# Patient Record
Sex: FEMALE | Race: WHITE | NOT HISPANIC OR LATINO | Employment: FULL TIME | URBAN - NONMETROPOLITAN AREA
[De-identification: names, ages, dates, MRNs, and addresses within clinical notes are randomized per-mention and may not be internally consistent; named-entity substitution may affect disease eponyms.]

---

## 2017-08-22 ENCOUNTER — HOSPITAL ENCOUNTER (EMERGENCY)
Facility: HOSPITAL | Age: 22
Discharge: HOME | End: 2017-08-22
Attending: EMERGENCY MEDICINE | Admitting: EMERGENCY MEDICINE

## 2017-08-22 ENCOUNTER — APPOINTMENT (OUTPATIENT)
Dept: GENERAL RADIOLOGY | Facility: HOSPITAL | Age: 22
End: 2017-08-22

## 2017-08-22 VITALS
SYSTOLIC BLOOD PRESSURE: 141 MMHG | TEMPERATURE: 98 F | HEIGHT: 63 IN | WEIGHT: 279 LBS | BODY MASS INDEX: 49.43 KG/M2 | DIASTOLIC BLOOD PRESSURE: 95 MMHG | OXYGEN SATURATION: 100 % | HEART RATE: 86 BPM | RESPIRATION RATE: 20 BRPM

## 2017-08-22 DIAGNOSIS — R07.9 CHEST PAIN, UNSPECIFIED TYPE: Primary | ICD-10-CM

## 2017-08-22 DIAGNOSIS — F41.8 ANXIETY ABOUT HEALTH: ICD-10-CM

## 2017-08-22 LAB
ALBUMIN SERPL-MCNC: 4.4 G/DL (ref 3.5–5)
ALBUMIN/GLOB SERPL: 1.4 G/DL (ref 1.5–2.5)
ALP SERPL-CCNC: 102 U/L (ref 35–104)
ALT SERPL W P-5'-P-CCNC: 22 U/L (ref 10–36)
ANION GAP SERPL CALCULATED.3IONS-SCNC: 6.7 MMOL/L (ref 3.6–11.2)
AST SERPL-CCNC: 21 U/L (ref 10–30)
B-HCG UR QL: NEGATIVE
BASOPHILS # BLD AUTO: 0.06 10*3/MM3 (ref 0–0.3)
BASOPHILS NFR BLD AUTO: 0.5 % (ref 0–2)
BILIRUB SERPL-MCNC: 0.4 MG/DL (ref 0.2–1.8)
BILIRUB UR QL STRIP: NEGATIVE
BUN BLD-MCNC: 11 MG/DL (ref 7–21)
BUN/CREAT SERPL: 13.1 (ref 7–25)
CALCIUM SPEC-SCNC: 10.1 MG/DL (ref 7.7–10)
CHLORIDE SERPL-SCNC: 109 MMOL/L (ref 99–112)
CK MB SERPL-CCNC: 0.25 NG/ML (ref 0–5)
CK MB SERPL-RTO: 0.3 % (ref 0–3)
CK SERPL-CCNC: 99 U/L (ref 24–173)
CLARITY UR: ABNORMAL
CO2 SERPL-SCNC: 25.3 MMOL/L (ref 24.3–31.9)
COLOR UR: YELLOW
CREAT BLD-MCNC: 0.84 MG/DL (ref 0.43–1.29)
DEPRECATED RDW RBC AUTO: 42.1 FL (ref 37–54)
EOSINOPHIL # BLD AUTO: 0.11 10*3/MM3 (ref 0–0.7)
EOSINOPHIL NFR BLD AUTO: 0.9 % (ref 0–5)
ERYTHROCYTE [DISTWIDTH] IN BLOOD BY AUTOMATED COUNT: 13.8 % (ref 11.5–14.5)
GFR SERPL CREATININE-BSD FRML MDRD: 83 ML/MIN/1.73
GLOBULIN UR ELPH-MCNC: 3.2 GM/DL
GLUCOSE BLD-MCNC: 95 MG/DL (ref 70–110)
GLUCOSE UR STRIP-MCNC: NEGATIVE MG/DL
HCT VFR BLD AUTO: 40.7 % (ref 37–47)
HGB BLD-MCNC: 13.4 G/DL (ref 12–16)
HGB UR QL STRIP.AUTO: NEGATIVE
IMM GRANULOCYTES # BLD: 0.04 10*3/MM3 (ref 0–0.03)
IMM GRANULOCYTES NFR BLD: 0.3 % (ref 0–0.5)
KETONES UR QL STRIP: NEGATIVE
LEUKOCYTE ESTERASE UR QL STRIP.AUTO: NEGATIVE
LIPASE SERPL-CCNC: 26 U/L (ref 13–60)
LYMPHOCYTES # BLD AUTO: 3.24 10*3/MM3 (ref 1–3)
LYMPHOCYTES NFR BLD AUTO: 25.2 % (ref 21–51)
MCH RBC QN AUTO: 28 PG (ref 27–33)
MCHC RBC AUTO-ENTMCNC: 32.9 G/DL (ref 33–37)
MCV RBC AUTO: 85 FL (ref 80–94)
MONOCYTES # BLD AUTO: 1.04 10*3/MM3 (ref 0.1–0.9)
MONOCYTES NFR BLD AUTO: 8.1 % (ref 0–10)
NEUTROPHILS # BLD AUTO: 8.37 10*3/MM3 (ref 1.4–6.5)
NEUTROPHILS NFR BLD AUTO: 65 % (ref 30–70)
NITRITE UR QL STRIP: NEGATIVE
OSMOLALITY SERPL CALC.SUM OF ELEC: 280.5 MOSM/KG (ref 273–305)
PH UR STRIP.AUTO: 7 [PH] (ref 5–8)
PLATELET # BLD AUTO: 377 10*3/MM3 (ref 130–400)
PMV BLD AUTO: 10.4 FL (ref 6–10)
POTASSIUM BLD-SCNC: 3.7 MMOL/L (ref 3.5–5.3)
PROT SERPL-MCNC: 7.6 G/DL (ref 6–8)
PROT UR QL STRIP: NEGATIVE
RBC # BLD AUTO: 4.79 10*6/MM3 (ref 4.2–5.4)
SODIUM BLD-SCNC: 141 MMOL/L (ref 135–153)
SP GR UR STRIP: 1.02 (ref 1–1.03)
TROPONIN I SERPL-MCNC: <0.006 NG/ML
UROBILINOGEN UR QL STRIP: ABNORMAL
WBC NRBC COR # BLD: 12.86 10*3/MM3 (ref 4.5–12.5)

## 2017-08-22 PROCEDURE — 71020 XR CHEST 2 VW: CPT | Performed by: RADIOLOGY

## 2017-08-22 PROCEDURE — 81025 URINE PREGNANCY TEST: CPT | Performed by: EMERGENCY MEDICINE

## 2017-08-22 PROCEDURE — 83690 ASSAY OF LIPASE: CPT | Performed by: EMERGENCY MEDICINE

## 2017-08-22 PROCEDURE — 93005 ELECTROCARDIOGRAM TRACING: CPT | Performed by: FAMILY MEDICINE

## 2017-08-22 PROCEDURE — 85025 COMPLETE CBC W/AUTO DIFF WBC: CPT | Performed by: EMERGENCY MEDICINE

## 2017-08-22 PROCEDURE — 99284 EMERGENCY DEPT VISIT MOD MDM: CPT

## 2017-08-22 PROCEDURE — 71020 HC CHEST PA AND LATERAL: CPT

## 2017-08-22 PROCEDURE — 93010 ELECTROCARDIOGRAM REPORT: CPT | Performed by: INTERNAL MEDICINE

## 2017-08-22 PROCEDURE — 82553 CREATINE MB FRACTION: CPT | Performed by: EMERGENCY MEDICINE

## 2017-08-22 PROCEDURE — 82550 ASSAY OF CK (CPK): CPT | Performed by: EMERGENCY MEDICINE

## 2017-08-22 PROCEDURE — 81003 URINALYSIS AUTO W/O SCOPE: CPT | Performed by: EMERGENCY MEDICINE

## 2017-08-22 PROCEDURE — 84484 ASSAY OF TROPONIN QUANT: CPT | Performed by: EMERGENCY MEDICINE

## 2017-08-22 PROCEDURE — 80053 COMPREHEN METABOLIC PANEL: CPT | Performed by: EMERGENCY MEDICINE

## 2017-08-22 RX ORDER — NORETHINDRONE ACETATE AND ETHINYL ESTRADIOL 1; 5 MG/1; UG/1
TABLET ORAL DAILY
COMMUNITY

## 2017-08-22 NOTE — ED NOTES
Patient sitting in lobby using cell phone, no acute distress noted.      Marbella Rodríguez RN  08/22/17 9460

## 2017-08-23 NOTE — ED NOTES
Pt anxious response to being stuck by ER tech. Pt given detailed explanations about the process and the reasons for lab collection. Pt calmed after instructed to breathe in through nose and out of mouth. Pt tolerated well. Mother at bedside.      Sonia Barnard RN  08/22/17 0062

## 2017-08-23 NOTE — ED NOTES
Upon discharge Pt sitting up in bed crying worried that she will not be able to sleep at night due to her anxiety. Pt was offered to discuss the matters with Dr. Kwok and refused. Pt was educated about effective coping mechanisms and ways of distraction. Pt states that she has a psychiatrist and will follow up with them and her PCP to discuss ways to manage anxiety. Pt states that she does not want medicine to help with anxiety. Mother at bedside and educated along with patient.      Sonia Barnard RN  08/22/17 6204

## 2017-08-23 NOTE — ED PROVIDER NOTES
"Subjective   HPI Comments: Pt comes in today with C/O \"I think I had a severe panic attack on Sunday\".  Pt describes episode of being very anxious over \"sinus issues\".  Pt got SOA, Weak and dizzy then almost passed out.  Pt has felt like her left chest was tight.  Today she saw her ENT for her sinus issues.  Prescribed flonase, prednisone and zyrtec.  She has only started the flonase.  While at ENT pt was told her episode on Sunday may have been because of her heart.  This made her have increased anxiety and her chest tightened up and her left arm started tingling.    Patient is a 22 y.o. female presenting with chest pain.   History provided by:  Patient and parent  Chest Pain   Pain location:  L chest  Pain quality: tightness    Pain radiates to:  L arm  Duration:  2 days  Progression:  Waxing and waning  Context: stress    Context: not breathing, not drug use, not eating, not intercourse, not lifting, not movement, not raising an arm, not at rest and not trauma    Relieved by:  Nothing  Worsened by:  Nothing  Ineffective treatments:  None tried  Associated symptoms: anxiety, dizziness, near-syncope, numbness, shortness of breath and weakness    Associated symptoms: no abdominal pain, no AICD problem, no altered mental status, no anorexia, no back pain, no claudication, no cough, no diaphoresis, no dysphagia, no fatigue, no fever, no headache, no heartburn, no lower extremity edema, no nausea, no orthopnea, no palpitations, no PND, no syncope and no vomiting    Risk factors: birth control and obesity    Risk factors: no aortic disease, no coronary artery disease, no diabetes mellitus, no Blanche-Danlos syndrome, no high cholesterol, no hypertension, no immobilization, not male, no Marfan's syndrome, not pregnant, no prior DVT/PE, no smoking and no surgery        Review of Systems   Constitutional: Negative for diaphoresis, fatigue and fever.   HENT: Positive for congestion and postnasal drip. Negative for nosebleeds " and trouble swallowing.    Eyes: Negative.    Respiratory: Positive for chest tightness and shortness of breath. Negative for cough.    Cardiovascular: Positive for chest pain and near-syncope. Negative for palpitations, orthopnea, claudication, syncope and PND.   Gastrointestinal: Negative.  Negative for abdominal pain, anorexia, heartburn, nausea and vomiting.   Endocrine: Negative.    Genitourinary: Negative.    Musculoskeletal: Negative.  Negative for back pain.   Skin: Negative.    Allergic/Immunologic: Negative.    Neurological: Positive for dizziness, weakness and numbness. Negative for headaches.   Psychiatric/Behavioral: The patient is nervous/anxious.    All other systems reviewed and are negative.      Past Medical History:   Diagnosis Date   • Anxiety        No Known Allergies    History reviewed. No pertinent surgical history.    History reviewed. No pertinent family history.    Social History     Social History   • Marital status: Single     Spouse name: N/A   • Number of children: N/A   • Years of education: N/A     Social History Main Topics   • Smoking status: Never Smoker   • Smokeless tobacco: None   • Alcohol use None   • Drug use: None   • Sexual activity: Not Asked     Other Topics Concern   • None     Social History Narrative   • None           Objective   Physical Exam   Constitutional: She is oriented to person, place, and time. She appears well-developed and well-nourished. No distress.   Resting comfortably in stretcher.  Reclined, smiling, texting   HENT:   Head: Normocephalic and atraumatic.   Nose: Nose normal.   Moist mucous membranes   Eyes: Conjunctivae are normal. Pupils are equal, round, and reactive to light. Right eye exhibits no discharge. Left eye exhibits no discharge. No scleral icterus.   Neck: Normal range of motion. Neck supple. No tracheal deviation present.   Cardiovascular: Normal rate, regular rhythm, normal heart sounds and intact distal pulses.  Exam reveals no  gallop and no friction rub.    No murmur heard.  Pulmonary/Chest: Effort normal and breath sounds normal. No stridor. No respiratory distress. She has no wheezes. She has no rales.   Abdominal: Soft. Bowel sounds are normal. She exhibits no distension. There is no tenderness. There is no guarding.   Musculoskeletal: Normal range of motion. She exhibits no deformity.   Neurological: She is alert and oriented to person, place, and time. No cranial nerve deficit. She exhibits normal muscle tone. Coordination normal.   Skin: Skin is warm and dry. No pallor.   Psychiatric:   anxious   Nursing note and vitals reviewed.      Procedures         ED Course  ED Course   Value Comment By Time   ECG 12 Lead 12-lead EKG performed at 1845 hrs.  Normal sinus rhythm.  Normal ECG.  Ventricular rate 98.  MI interval 130.  QRS duration 70.  QTc 338.  QTc 431.  No significant ectopy or sustained dysrhythmia.  No pathologic blocks.  No acute ischemic ST segment deviation. Tj Abdi MD 08/22 2134    PA and lateral chest x-rayMy readNo apparent acute infiltrate or injury Tj Abdi MD 08/22 2135    Patient hemodynamically stable and neurologically intact while in the emergency department.  Patient had a panic attack related to blood draw.  She is now calm down  No significantly abnormal findings. Tj Abdi MD 08/22 2145            HEART Score  History: Slightly suspicious (+0)  ECG: Normal (+0)  Age: Less than 45 (+0)  Risk Factors: 1 - 2 risk factors (+1)  Troponin: Normal limit or lower (+0)  Total: 1       XR Chest 2 View    (Results Pending)     Labs Reviewed   COMPREHENSIVE METABOLIC PANEL - Abnormal; Notable for the following:        Result Value    Calcium 10.1 (*)     A/G Ratio 1.4 (*)     All other components within normal limits   URINALYSIS W/ CULTURE IF INDICATED - Abnormal; Notable for the following:     Appearance, UA Turbid (*)     All other components within normal limits    Narrative:      Urine microscopic not indicated.   CBC WITH AUTO DIFFERENTIAL - Abnormal; Notable for the following:     WBC 12.86 (*)     MCHC 32.9 (*)     MPV 10.4 (*)     Neutrophils, Absolute 8.37 (*)     Lymphocytes, Absolute 3.24 (*)     Monocytes, Absolute 1.04 (*)     Immature Grans, Absolute 0.04 (*)     All other components within normal limits   LIPASE - Normal   PREGNANCY, URINE - Normal    Narrative:     Diluted specimens may cause false negative results.   CK - Normal   CK MB - Normal   TROPONIN (IN-HOUSE) - Normal    Narrative:     Ultra Troponin I Reference Range:         <=0.039 ng/mL: Negative    0.04-0.779 ng/mL: Indeterminate Range. Suspicious of MI.  Clinical correlation required.       >=0.78  ng/mL: Consistent with myocardial injury.  Clinical correlation required.   OSMOLALITY, CALCULATED - Normal   CKMB INDEX CALCULATION - Normal   CBC AND DIFFERENTIAL    Narrative:     The following orders were created for panel order CBC & Differential.  Procedure                               Abnormality         Status                     ---------                               -----------         ------                     CBC Auto Differential[[id number]]        Abnormal            Final result                 Please view results for these tests on the individual orders.        Medication List      CONTINUE taking these medications          norethindrone-ethinyl estradiol 1-5 MG-MCG tablet   Commonly known as:  FEMHRT 1/5             MDM  Number of Diagnoses or Management Options  Anxiety about health: established and worsening  Chest pain, unspecified type: new and requires workup     Amount and/or Complexity of Data Reviewed  Clinical lab tests: ordered and reviewed  Tests in the radiology section of CPT®: ordered and reviewed  Independent visualization of images, tracings, or specimens: yes    Risk of Complications, Morbidity, and/or Mortality  Presenting problems: high  Diagnostic procedures: moderate  Management  options: moderate    Patient Progress  Patient progress: stable      Final diagnoses:   Chest pain, unspecified type   Anxiety about health            Tj Abdi MD  08/22/17 3014

## 2023-08-14 ENCOUNTER — TRANSCRIBE ORDERS (OUTPATIENT)
Dept: ADMINISTRATIVE | Facility: HOSPITAL | Age: 28
End: 2023-08-14
Payer: MEDICAID

## 2023-08-14 DIAGNOSIS — M79.662 PAIN AND SWELLING OF LEFT LOWER LEG: ICD-10-CM

## 2023-08-14 DIAGNOSIS — M79.89 PAIN AND SWELLING OF LEFT LOWER LEG: ICD-10-CM

## 2023-09-19 ENCOUNTER — TRANSCRIBE ORDERS (OUTPATIENT)
Dept: OCCUPATIONAL THERAPY | Facility: HOSPITAL | Age: 28
End: 2023-09-19
Payer: MEDICAID

## 2023-09-19 DIAGNOSIS — I89.0 LYMPHEDEMA: Primary | ICD-10-CM

## 2023-09-22 ENCOUNTER — HOSPITAL ENCOUNTER (OUTPATIENT)
Dept: RESPIRATORY THERAPY | Facility: HOSPITAL | Age: 28
Discharge: HOME | End: 2023-09-22
Payer: MEDICAID

## 2023-09-22 ENCOUNTER — TRANSCRIBE ORDERS (OUTPATIENT)
Dept: ADMINISTRATIVE | Facility: HOSPITAL | Age: 28
End: 2023-09-22
Payer: MEDICAID

## 2023-09-22 ENCOUNTER — LAB (OUTPATIENT)
Dept: LAB | Facility: HOSPITAL | Age: 28
End: 2023-09-22
Payer: MEDICAID

## 2023-09-22 DIAGNOSIS — R00.1 BRADYCARDIA: ICD-10-CM

## 2023-09-22 DIAGNOSIS — R00.1 BRADYCARDIA: Primary | ICD-10-CM

## 2023-09-22 PROCEDURE — 80053 COMPREHEN METABOLIC PANEL: CPT

## 2023-09-22 PROCEDURE — 93246 EXT ECG>7D<15D RECORDING: CPT

## 2023-09-22 PROCEDURE — 83036 HEMOGLOBIN GLYCOSYLATED A1C: CPT

## 2023-09-22 PROCEDURE — 36415 COLL VENOUS BLD VENIPUNCTURE: CPT

## 2023-09-22 PROCEDURE — 84443 ASSAY THYROID STIM HORMONE: CPT

## 2023-09-23 ENCOUNTER — APPOINTMENT (OUTPATIENT)
Dept: GENERAL RADIOLOGY | Facility: HOSPITAL | Age: 28
End: 2023-09-23
Payer: MEDICAID

## 2023-09-23 ENCOUNTER — HOSPITAL ENCOUNTER (EMERGENCY)
Facility: HOSPITAL | Age: 28
Discharge: HOME | End: 2023-09-23
Attending: STUDENT IN AN ORGANIZED HEALTH CARE EDUCATION/TRAINING PROGRAM
Payer: MEDICAID

## 2023-09-23 VITALS
OXYGEN SATURATION: 98 % | TEMPERATURE: 98.5 F | DIASTOLIC BLOOD PRESSURE: 88 MMHG | BODY MASS INDEX: 46.07 KG/M2 | WEIGHT: 260 LBS | SYSTOLIC BLOOD PRESSURE: 125 MMHG | HEIGHT: 63 IN | HEART RATE: 59 BPM | RESPIRATION RATE: 18 BRPM

## 2023-09-23 DIAGNOSIS — R00.1 BRADYCARDIA: Primary | ICD-10-CM

## 2023-09-23 LAB
ALBUMIN SERPL-MCNC: 3.7 G/DL (ref 3.5–5.2)
ALBUMIN SERPL-MCNC: 3.9 G/DL (ref 3.5–5.2)
ALBUMIN/GLOB SERPL: 1.3 G/DL
ALBUMIN/GLOB SERPL: 1.5 G/DL
ALP SERPL-CCNC: 87 U/L (ref 39–117)
ALP SERPL-CCNC: 95 U/L (ref 39–117)
ALT SERPL W P-5'-P-CCNC: 18 U/L (ref 1–33)
ALT SERPL W P-5'-P-CCNC: 22 U/L (ref 1–33)
ANION GAP SERPL CALCULATED.3IONS-SCNC: 10.9 MMOL/L (ref 5–15)
ANION GAP SERPL CALCULATED.3IONS-SCNC: 11.7 MMOL/L (ref 5–15)
APTT PPP: 33.9 SECONDS (ref 26.5–34.5)
AST SERPL-CCNC: 18 U/L (ref 1–32)
AST SERPL-CCNC: 21 U/L (ref 1–32)
B-HCG UR QL: NEGATIVE
BACTERIA UR QL AUTO: ABNORMAL /HPF
BASOPHILS # BLD AUTO: 0.06 10*3/MM3 (ref 0–0.2)
BASOPHILS NFR BLD AUTO: 0.8 % (ref 0–1.5)
BILIRUB SERPL-MCNC: 0.7 MG/DL (ref 0–1.2)
BILIRUB SERPL-MCNC: 0.7 MG/DL (ref 0–1.2)
BILIRUB UR QL STRIP: NEGATIVE
BUN SERPL-MCNC: 8 MG/DL (ref 6–20)
BUN SERPL-MCNC: 8 MG/DL (ref 6–20)
BUN/CREAT SERPL: 10.1 (ref 7–25)
BUN/CREAT SERPL: 12.7 (ref 7–25)
CALCIUM SPEC-SCNC: 9.3 MG/DL (ref 8.6–10.5)
CALCIUM SPEC-SCNC: 9.4 MG/DL (ref 8.6–10.5)
CHLORIDE SERPL-SCNC: 103 MMOL/L (ref 98–107)
CHLORIDE SERPL-SCNC: 106 MMOL/L (ref 98–107)
CLARITY UR: ABNORMAL
CO2 SERPL-SCNC: 22.3 MMOL/L (ref 22–29)
CO2 SERPL-SCNC: 24.1 MMOL/L (ref 22–29)
COLOR UR: ABNORMAL
CREAT SERPL-MCNC: 0.63 MG/DL (ref 0.57–1)
CREAT SERPL-MCNC: 0.79 MG/DL (ref 0.57–1)
DEPRECATED RDW RBC AUTO: 46.8 FL (ref 37–54)
EGFRCR SERPLBLD CKD-EPI 2021: 103.3 ML/MIN/1.73
EGFRCR SERPLBLD CKD-EPI 2021: 122.6 ML/MIN/1.73
EOSINOPHIL # BLD AUTO: 0.11 10*3/MM3 (ref 0–0.4)
EOSINOPHIL NFR BLD AUTO: 1.4 % (ref 0.3–6.2)
ERYTHROCYTE [DISTWIDTH] IN BLOOD BY AUTOMATED COUNT: 14.4 % (ref 12.3–15.4)
GEN 5 2HR TROPONIN T REFLEX: <6 NG/L
GLOBULIN UR ELPH-MCNC: 2.6 GM/DL
GLOBULIN UR ELPH-MCNC: 2.9 GM/DL
GLUCOSE SERPL-MCNC: 88 MG/DL (ref 65–99)
GLUCOSE SERPL-MCNC: 91 MG/DL (ref 65–99)
GLUCOSE UR STRIP-MCNC: NEGATIVE MG/DL
HBA1C MFR BLD: 4.8 % (ref 4.8–5.6)
HCT VFR BLD AUTO: 40.5 % (ref 34–46.6)
HGB BLD-MCNC: 13.1 G/DL (ref 12–15.9)
HGB UR QL STRIP.AUTO: ABNORMAL
HYALINE CASTS UR QL AUTO: ABNORMAL /LPF
IMM GRANULOCYTES # BLD AUTO: 0.02 10*3/MM3 (ref 0–0.05)
IMM GRANULOCYTES NFR BLD AUTO: 0.3 % (ref 0–0.5)
INR PPP: 1.04 (ref 0.9–1.1)
KETONES UR QL STRIP: ABNORMAL
LEUKOCYTE ESTERASE UR QL STRIP.AUTO: ABNORMAL
LYMPHOCYTES # BLD AUTO: 1.69 10*3/MM3 (ref 0.7–3.1)
LYMPHOCYTES NFR BLD AUTO: 21.2 % (ref 19.6–45.3)
MCH RBC QN AUTO: 29.2 PG (ref 26.6–33)
MCHC RBC AUTO-ENTMCNC: 32.3 G/DL (ref 31.5–35.7)
MCV RBC AUTO: 90.2 FL (ref 79–97)
MONOCYTES # BLD AUTO: 0.67 10*3/MM3 (ref 0.1–0.9)
MONOCYTES NFR BLD AUTO: 8.4 % (ref 5–12)
NEUTROPHILS NFR BLD AUTO: 5.42 10*3/MM3 (ref 1.7–7)
NEUTROPHILS NFR BLD AUTO: 67.9 % (ref 42.7–76)
NITRITE UR QL STRIP: NEGATIVE
NRBC BLD AUTO-RTO: 0 /100 WBC (ref 0–0.2)
PH UR STRIP.AUTO: 6.5 [PH] (ref 5–8)
PLATELET # BLD AUTO: 257 10*3/MM3 (ref 140–450)
PMV BLD AUTO: 11.4 FL (ref 6–12)
POTASSIUM SERPL-SCNC: 3.7 MMOL/L (ref 3.5–5.2)
POTASSIUM SERPL-SCNC: 4.1 MMOL/L (ref 3.5–5.2)
PROT SERPL-MCNC: 6.5 G/DL (ref 6–8.5)
PROT SERPL-MCNC: 6.6 G/DL (ref 6–8.5)
PROT UR QL STRIP: ABNORMAL
PROTHROMBIN TIME: 14.1 SECONDS (ref 12.1–14.7)
RBC # BLD AUTO: 4.49 10*6/MM3 (ref 3.77–5.28)
RBC # UR STRIP: ABNORMAL /HPF
REF LAB TEST METHOD: ABNORMAL
SODIUM SERPL-SCNC: 137 MMOL/L (ref 136–145)
SODIUM SERPL-SCNC: 141 MMOL/L (ref 136–145)
SP GR UR STRIP: 1.03 (ref 1–1.03)
SQUAMOUS #/AREA URNS HPF: ABNORMAL /HPF
TROPONIN T DELTA: NORMAL
TROPONIN T SERPL HS-MCNC: <6 NG/L
TSH SERPL DL<=0.05 MIU/L-ACNC: 2.75 UIU/ML (ref 0.27–4.2)
TSH SERPL DL<=0.05 MIU/L-ACNC: 2.79 UIU/ML (ref 0.27–4.2)
UROBILINOGEN UR QL STRIP: ABNORMAL
WBC # UR STRIP: ABNORMAL /HPF
WBC NRBC COR # BLD: 7.97 10*3/MM3 (ref 3.4–10.8)

## 2023-09-23 PROCEDURE — 84443 ASSAY THYROID STIM HORMONE: CPT | Performed by: NURSE PRACTITIONER

## 2023-09-23 PROCEDURE — 93005 ELECTROCARDIOGRAM TRACING: CPT | Performed by: EMERGENCY MEDICINE

## 2023-09-23 PROCEDURE — 81025 URINE PREGNANCY TEST: CPT | Performed by: NURSE PRACTITIONER

## 2023-09-23 PROCEDURE — 80053 COMPREHEN METABOLIC PANEL: CPT | Performed by: NURSE PRACTITIONER

## 2023-09-23 PROCEDURE — 93010 ELECTROCARDIOGRAM REPORT: CPT | Performed by: INTERNAL MEDICINE

## 2023-09-23 PROCEDURE — 85025 COMPLETE CBC W/AUTO DIFF WBC: CPT | Performed by: NURSE PRACTITIONER

## 2023-09-23 PROCEDURE — 85610 PROTHROMBIN TIME: CPT | Performed by: NURSE PRACTITIONER

## 2023-09-23 PROCEDURE — 99284 EMERGENCY DEPT VISIT MOD MDM: CPT

## 2023-09-23 PROCEDURE — 84484 ASSAY OF TROPONIN QUANT: CPT | Performed by: NURSE PRACTITIONER

## 2023-09-23 PROCEDURE — 71045 X-RAY EXAM CHEST 1 VIEW: CPT

## 2023-09-23 PROCEDURE — 36415 COLL VENOUS BLD VENIPUNCTURE: CPT

## 2023-09-23 PROCEDURE — 71045 X-RAY EXAM CHEST 1 VIEW: CPT | Performed by: RADIOLOGY

## 2023-09-23 PROCEDURE — 85730 THROMBOPLASTIN TIME PARTIAL: CPT | Performed by: NURSE PRACTITIONER

## 2023-09-23 PROCEDURE — 81001 URINALYSIS AUTO W/SCOPE: CPT | Performed by: NURSE PRACTITIONER

## 2023-09-23 RX ORDER — SODIUM CHLORIDE 0.9 % (FLUSH) 0.9 %
10 SYRINGE (ML) INJECTION AS NEEDED
Status: DISCONTINUED | OUTPATIENT
Start: 2023-09-23 | End: 2023-09-23 | Stop reason: HOSPADM

## 2023-09-23 NOTE — ED PROVIDER NOTES
Subjective   History of Present Illness    Review of Systems    Past Medical History:   Diagnosis Date   • Anxiety        No Known Allergies    No past surgical history on file.    No family history on file.    Social History     Socioeconomic History   • Marital status: Single   Tobacco Use   • Smoking status: Never           Objective   Physical Exam    Procedures           ED Course  ED Course as of 09/23/23 1807   Sat Sep 23, 2023   1651 ECG 16:38 NSR, rate 68. Cannot R/O anterior infarct of undetermined age. QT/qTc 380/404 [HEATHER]      ED Course User Index  [HEATHER] Luis Javier MD                                           Medical Decision Making  Amount and/or Complexity of Data Reviewed  ECG/medicine tests: ordered.        Final diagnoses:   None       ED Disposition  ED Disposition       None            No follow-up provider specified.       Medication List      No changes were made to your prescriptions during this visit.          380/404 [HEATHER]      ED Course User Index  [HEATHER] Luis Javier MD                                           Medical Decision Making  Problems Addressed:  Bradycardia: complicated acute illness or injury    Amount and/or Complexity of Data Reviewed  Labs: ordered.  Radiology: ordered.  ECG/medicine tests: ordered.        Final diagnoses:   Bradycardia       ED Disposition  ED Disposition       ED Disposition   Discharge    Condition   Stable    Comment   --               Layton Burr MD  [address] [place] [address]  [phone]    Schedule an appointment as soon as possible for a visit   For further evaluation         Medication List      No changes were made to your prescriptions during this visit.            Luis Javier MD  09/28/23 0068

## 2023-09-23 NOTE — ED PROVIDER NOTES
Subjective   History of Present Illness  Patient is a 28-year-old female presents to the emergency room today for complaint of weakness, bradycardia, and dizziness.  Patient reports that she has history of anemia due to gastric bypass surgery.  Patient reports that she went to see her doctor due to not feeling well and states that her heart rate was low.  Patient reports has been staying in the 40s since Tuesday.  Patient denies chest pain reports she has had some weakness and shortness of breath.  Patient reports some dizziness at times.  Patient reports that she has been placed on a Holter monitor which she is wearing now and states that she believes that they scheduled her to see a cardiologist.  Patient denies any alleviating or worsening factors.      Review of Systems   Constitutional: Negative.    HENT: Negative.     Eyes: Negative.    Respiratory: Negative.     Cardiovascular: Negative.    Gastrointestinal: Negative.    Endocrine: Negative.    Genitourinary: Negative.    Musculoskeletal: Negative.    Skin: Negative.    Allergic/Immunologic: Negative.    Neurological: Negative.    Hematological: Negative.    Psychiatric/Behavioral: Negative.       Past Medical History:   Diagnosis Date    Anxiety        No Known Allergies    No past surgical history on file.    No family history on file.    Social History     Socioeconomic History    Marital status: Single   Tobacco Use    Smoking status: Never           Objective   Physical Exam  Vitals and nursing note reviewed.   Constitutional:       Appearance: She is well-developed.   HENT:      Head: Normocephalic.      Right Ear: External ear normal.      Left Ear: External ear normal.   Eyes:      Conjunctiva/sclera: Conjunctivae normal.      Pupils: Pupils are equal, round, and reactive to light.   Cardiovascular:      Rate and Rhythm: Normal rate and regular rhythm.      Heart sounds: Normal heart sounds.   Pulmonary:      Effort: Pulmonary effort is normal.       Breath sounds: Normal breath sounds.   Abdominal:      General: Bowel sounds are normal.      Palpations: Abdomen is soft.   Musculoskeletal:         General: Normal range of motion.      Cervical back: Normal range of motion and neck supple.   Skin:     General: Skin is warm and dry.      Capillary Refill: Capillary refill takes less than 2 seconds.   Neurological:      Mental Status: She is alert and oriented to person, place, and time.   Psychiatric:         Behavior: Behavior normal.         Thought Content: Thought content normal.       Procedures           ED Course  ED Course as of 09/23/23 1654   Sat Sep 23, 2023   1651 ECG 16:38 NSR, rate 68. Cannot R/O anterior infarct of undetermined age. QT/qTc 380/404 [HEATHER]      ED Course User Index  [HEATHER] Luis Javier MD                                           Medical Decision Making      Final diagnoses:   None       ED Disposition  ED Disposition       None            No follow-up provider specified.       Medication List      No changes were made to your prescriptions during this visit.

## 2023-09-24 LAB
QT INTERVAL: 380 MS
QTC INTERVAL: 404 MS

## 2023-09-28 ENCOUNTER — OFFICE VISIT (OUTPATIENT)
Dept: CARDIOLOGY | Facility: CLINIC | Age: 28
End: 2023-09-28
Payer: MEDICAID

## 2023-09-28 VITALS
OXYGEN SATURATION: 97 % | BODY MASS INDEX: 46.07 KG/M2 | SYSTOLIC BLOOD PRESSURE: 118 MMHG | HEART RATE: 70 BPM | HEIGHT: 63 IN | WEIGHT: 260 LBS | DIASTOLIC BLOOD PRESSURE: 86 MMHG

## 2023-09-28 DIAGNOSIS — R55 POSTURAL DIZZINESS WITH PRESYNCOPE: ICD-10-CM

## 2023-09-28 DIAGNOSIS — I10 PRIMARY HYPERTENSION: Primary | ICD-10-CM

## 2023-09-28 DIAGNOSIS — R42 POSTURAL DIZZINESS WITH PRESYNCOPE: ICD-10-CM

## 2023-09-28 RX ORDER — ERGOCALCIFEROL 1.25 MG/1
1 CAPSULE ORAL WEEKLY
COMMUNITY
Start: 2023-09-13

## 2023-09-28 RX ORDER — ESCITALOPRAM OXALATE 20 MG/1
1 TABLET ORAL DAILY
COMMUNITY
Start: 2023-09-17

## 2023-09-28 NOTE — PROGRESS NOTES
"     [facility]  [address] [place] [address]  Phone: [phone]  Fax: [phone]    09/28/2023    Chief Complaint   Patient presents with    Establish Care     Recently seen in ED, SOA,chronic fatigue, complains of low heart rate.    Med Review     Tolerating current medications.        History:     Radhika Lopez is a 28 y.o. female presenting for  initial evaluation   Clinically stable from cardiac standpoint   Symptoms:  CP no  SOB stable    Orthopnea Yes  Lower extremity edema no   Palpitations stable   Compliant with medications yes  Claudication no  She used to be on Atenolol for HTN which she stopped taking due to low blood pressure and bradycardia     Past Medical History:   Diagnosis Date    Anxiety     H/O gastric bypass 06/01/2023       History reviewed. No pertinent surgical history.     Past Social History:  Social History     Socioeconomic History    Marital status: Single   Tobacco Use    Smoking status: Never       Past Family History:  Family History   Problem Relation Age of Onset    Heart disease Maternal Grandmother        Review of Systems:   ROS       Current Outpatient Medications   Medication Sig Dispense Refill    escitalopram (LEXAPRO) 20 MG tablet Take 1 tablet by mouth Daily.      vitamin D (ERGOCALCIFEROL) 1.25 MG (99821 UT) capsule capsule Take 1 capsule by mouth 1 (One) Time Per Week.      norethindrone-ethinyl estradiol (FEMHRT 1/5) 1-5 MG-MCG tablet Take  by mouth Daily.       No current facility-administered medications for this visit.        No Known Allergies    Objective     /86   Pulse 70   Ht 160 cm (63\")   Wt 118 kg (260 lb)   LMP 08/23/2023   SpO2 97%   BMI 46.06 kg/m²     Physical Exam       DATA:          ECG 12 Lead    Date/Time: 9/28/2023 12:00 PM  Performed by: Layton Burr MD  Authorized by: Layton Burr MD          No results found for: CHOL, CHLPL  No results found for: " TRIG  No results found for: HDL  No results found for: LDL, LDLDIRECT    Lab Results   Component Value Date    TSH 2.790 09/23/2023         Results from last 7 days   Lab Units 09/23/23  1727 09/22/23  1343   SODIUM mmol/L 141 137   POTASSIUM mmol/L 3.7 4.1   CHLORIDE mmol/L 106 103   CO2 mmol/L 24.1 22.3   BUN mg/dL 8 8   CREATININE mg/dL 0.79 0.63   CALCIUM mg/dL 9.3 9.4   BILIRUBIN mg/dL 0.7 0.7   ALK PHOS U/L 95 87   ALT (SGPT) U/L 18 22   AST (SGOT) U/L 18 21   GLUCOSE mg/dL 91 88           Assessment and Plan    Assessment and Plan    Problem List Items Addressed This Visit    None  Visit Diagnoses       Primary hypertension    -  Primary                Recommended increase activity to 30 minutes of walking daily, most days of the week    Thank you for allowing me to participate in the care of Radhika Lopez. Feel free to contact me directly with any further questions or concerns.          Latyon Burr MD, FACC  Interventional Cardiology

## 2023-10-05 PROBLEM — I10 PRIMARY HYPERTENSION: Status: ACTIVE | Noted: 2023-10-05

## 2024-02-06 ENCOUNTER — OFFICE VISIT (OUTPATIENT)
Dept: CARDIOLOGY | Facility: CLINIC | Age: 29
End: 2024-02-06
Payer: MEDICAID

## 2024-02-06 VITALS
HEART RATE: 71 BPM | DIASTOLIC BLOOD PRESSURE: 76 MMHG | WEIGHT: 225.4 LBS | BODY MASS INDEX: 39.94 KG/M2 | HEIGHT: 63 IN | OXYGEN SATURATION: 98 % | SYSTOLIC BLOOD PRESSURE: 107 MMHG

## 2024-02-06 DIAGNOSIS — R00.2 PALPITATIONS: ICD-10-CM

## 2024-02-06 DIAGNOSIS — R42 POSTURAL DIZZINESS WITH PRESYNCOPE: ICD-10-CM

## 2024-02-06 DIAGNOSIS — R55 POSTURAL DIZZINESS WITH PRESYNCOPE: ICD-10-CM

## 2024-02-06 DIAGNOSIS — I49.5 TACHY-BRADY SYNDROME: Primary | ICD-10-CM

## 2024-02-06 DIAGNOSIS — I10 PRIMARY HYPERTENSION: ICD-10-CM

## 2024-02-06 PROCEDURE — 1160F RVW MEDS BY RX/DR IN RCRD: CPT | Performed by: NURSE PRACTITIONER

## 2024-02-06 PROCEDURE — 1159F MED LIST DOCD IN RCRD: CPT | Performed by: NURSE PRACTITIONER

## 2024-02-06 PROCEDURE — 3074F SYST BP LT 130 MM HG: CPT | Performed by: NURSE PRACTITIONER

## 2024-02-06 PROCEDURE — 99214 OFFICE O/P EST MOD 30 MIN: CPT | Performed by: NURSE PRACTITIONER

## 2024-02-06 PROCEDURE — 3078F DIAST BP <80 MM HG: CPT | Performed by: NURSE PRACTITIONER

## 2024-02-06 RX ORDER — AMOXICILLIN AND CLAVULANATE POTASSIUM 875; 125 MG/1; MG/1
1 TABLET, FILM COATED ORAL EVERY 12 HOURS SCHEDULED
COMMUNITY
Start: 2023-10-27

## 2024-02-06 RX ORDER — BUSPIRONE HYDROCHLORIDE 15 MG/1
TABLET ORAL
COMMUNITY
Start: 2024-01-17

## 2024-02-06 NOTE — PROGRESS NOTES
"Chief Complaint  Follow-up (Pt denies CP, experiences palpitations,dizziness, random SOA, complains of low HR,some fatigue (low energy) Attempted to gamaliel. ECHO was told it was .) and Med Review (Tolerating all current medications.)    Subjective          Radhika Lopez presents to [facility] for follow up.    History of Present Illness  Ms Lopez presents for follow-up of palpitations, dizziness, and shortness of air.  Her last visit to our clinic was 2023 with Dr. Jaquez.  At that time, an echocardiogram was ordered but she was unable to keep her appointment as it has not been performed.    She had gastric bypass surgery 2023.  She reports that she has always had palpitations and fatigue, but seeing her heart rate in the 30s didn't begin until after the surgery.      She did a holter monitor as ordered by her PCP that revealed heart rates 40s - 170s.  She feels near syncopal with every position change.  She reports that she has never actually passed out.    Tobacco Use: Unknown (2024)    Patient History     Smoking Tobacco Use: Never     Smokeless Tobacco Use: Unknown     Passive Exposure: Not on file       Objective     Vital Signs:   /76   Pulse 71   Ht 160 cm (63\")   Wt 102 kg (225 lb 6.4 oz)   SpO2 98%   BMI 39.93 kg/m²       Physical Exam     Result Review :   The following data was reviewed by: ANT Vides on 2024:  Common labs          2023    13:43 2023    17:27   Common Labs   Glucose 88  91    BUN 8  8    Creatinine 0.63  0.79    Sodium 137  141    Potassium 4.1  3.7    Chloride 103  106    Calcium 9.4  9.3    Albumin 3.9  3.7    Total Bilirubin 0.7  0.7    Alkaline Phosphatase 87  95    AST (SGOT) 21  18    ALT (SGPT) 22  18    WBC  7.97    Hemoglobin  13.1    Hematocrit  40.5    Platelets  257    Hemoglobin A1C 4.80                         Current Outpatient Medications   Medication Sig Dispense Refill    " amoxicillin-clavulanate (AUGMENTIN) 875-125 MG per tablet Take 1 tablet by mouth Every 12 (Twelve) Hours.      busPIRone (BUSPAR) 15 MG tablet TAKE ONE (1) TABLET BY MOUTH TWICE A DAY FOR ANXIETY      escitalopram (LEXAPRO) 20 MG tablet Take 1 tablet by mouth Daily.      vitamin D (ERGOCALCIFEROL) 1.25 MG (46292 UT) capsule capsule Take 1 capsule by mouth 1 (One) Time Per Week.       No current facility-administered medications for this visit.            Assessment and Plan    Problem List Items Addressed This Visit       Postural dizziness with presyncope    Primary hypertension     Other Visit Diagnoses       Tachy-scout syndrome    -  Primary    Relevant Orders    Cardiac Event Monitor    Ambulatory Referral to Cardiac Electrophysiology    Palpitations        Relevant Orders    Cardiac Event Monitor    Adult Transthoracic Echo Complete w/ Color, Spectral and Contrast if necessary per protocol          Diagnoses and all orders for this visit:    1. Tachy-scout syndrome (Primary)  -     Cardiac Event Monitor  -     Ambulatory Referral to Cardiac Electrophysiology    2. Palpitations  -     Cardiac Event Monitor  -     Adult Transthoracic Echo Complete w/ Color, Spectral and Contrast if necessary per protocol; Future    3. Primary hypertension    4. Postural dizziness with presyncope            Follow Up     Return in about 4 weeks (around 3/5/2024).    Patient was given instructions and counseling regarding her condition or for health maintenance advice. Please see specific information pulled into the AVS if appropriate.       Electronically signed by ANT Vides, 02/11/24, 5:28 PM EST.      Dictated Utilizing Dragon Dictation: Part of this note may be an electronic transcription/translation of spoken language to printed text using the Dragon Dictation System

## 2024-03-04 ENCOUNTER — OFFICE VISIT (OUTPATIENT)
Dept: CARDIOLOGY | Facility: CLINIC | Age: 29
End: 2024-03-04
Payer: MEDICAID

## 2024-03-04 VITALS
HEIGHT: 63 IN | OXYGEN SATURATION: 99 % | BODY MASS INDEX: 40.22 KG/M2 | WEIGHT: 227 LBS | SYSTOLIC BLOOD PRESSURE: 102 MMHG | DIASTOLIC BLOOD PRESSURE: 66 MMHG | HEART RATE: 59 BPM

## 2024-03-04 DIAGNOSIS — R55 POSTURAL DIZZINESS WITH PRESYNCOPE: Primary | ICD-10-CM

## 2024-03-04 DIAGNOSIS — R42 POSTURAL DIZZINESS WITH PRESYNCOPE: Primary | ICD-10-CM

## 2024-03-04 PROCEDURE — 93000 ELECTROCARDIOGRAM COMPLETE: CPT | Performed by: INTERNAL MEDICINE

## 2024-03-04 PROCEDURE — 99204 OFFICE O/P NEW MOD 45 MIN: CPT | Performed by: INTERNAL MEDICINE

## 2024-03-04 PROCEDURE — 3074F SYST BP LT 130 MM HG: CPT | Performed by: INTERNAL MEDICINE

## 2024-03-04 PROCEDURE — 3078F DIAST BP <80 MM HG: CPT | Performed by: INTERNAL MEDICINE

## 2024-03-04 RX ORDER — LEVOCETIRIZINE DIHYDROCHLORIDE 5 MG/1
5 TABLET, FILM COATED ORAL AS NEEDED
COMMUNITY
Start: 2023-10-27

## 2024-03-04 NOTE — PROGRESS NOTES
Cardiac Electrophysiology Outpatient Consult Note            [facility]    Consult Note     Radhika Lopez  [id number]  2024  [phone]     Primary Care Physician: Flor Isabel APRN    Referred By: Rachel Valente APRN    Subjective     Chief Complaint:   Diagnoses and all orders for this visit:    1. Postural dizziness with presyncope (Primary)      Chief Complaint   Patient presents with   • Tachy-scout syndrome   • Dizziness       History of Present Illness:   Radhika Lopez is a 28 y.o. female who presents to my electrophysiology clinic for evaluation of above complaints.  Zuhair here to discuss her heart rate monitor.    Patient underwent gastric bypass surgery last year.  Since then the road of recovery has involved losing 100 pounds intentionally.  She is doing well.  She does have some anxiety.  She at some point had a heart rhythm monitor which she was told was highly abnormal and she might need a pacemaker.  She is here today to discuss this..      Past Medical History:   Past Medical History:   Diagnosis Date   • Abnormal ECG 23   • Anxiety    • H/O gastric bypass 2023   • Hypertension 2020    Lost weight, meds no longer needed       Past Surgical History: History reviewed. No pertinent surgical history.    Family History:   Family History   Problem Relation Age of Onset   • Arrhythmia Mother    • Heart disease Mother    • Hypertension Father    • Heart disease Maternal Grandmother        Social History:   Social History     Socioeconomic History   • Marital status: Single   Tobacco Use   • Smoking status: Former     Current packs/day: 0.00     Average packs/day: 0.3 packs/day for 4.9 years (1.2 ttl pk-yrs)     Types: Cigarettes, Electronic Cigarette     Start date: 2018     Quit date: 11/15/2022     Years since quittin.3   • Smokeless tobacco: Never   Vaping Use   • Vaping status: Former   • Substances: Nicotine   •  "Devices: Disposable   Substance and Sexual Activity   • Alcohol use: Not Currently   • Drug use: Never   • Sexual activity: Yes     Partners: Male     Birth control/protection: Condom, I.U.D.       Medications:     Current Outpatient Medications:   •  busPIRone (BUSPAR) 15 MG tablet, TAKE ONE (1) TABLET BY MOUTH TWICE A DAY FOR ANXIETY, Disp: , Rfl:   •  escitalopram (LEXAPRO) 20 MG tablet, Take 1 tablet by mouth Daily., Disp: , Rfl:   •  levocetirizine (XYZAL) 5 MG tablet, Take 1 tablet by mouth As Needed., Disp: , Rfl:   •  vitamin D (ERGOCALCIFEROL) 1.25 MG (74473 UT) capsule capsule, Take 1 capsule by mouth 1 (One) Time Per Week., Disp: , Rfl:   •  amoxicillin-clavulanate (AUGMENTIN) 875-125 MG per tablet, Take 1 tablet by mouth Every 12 (Twelve) Hours. (Patient not taking: Reported on 3/4/2024), Disp: , Rfl:     Allergies:   No Known Allergies    Objective   Vital Signs:   Vitals:    03/04/24 0928   BP: 102/66   BP Location: Left arm   Patient Position: Sitting   Pulse: 59   SpO2: 99%   Weight: 103 kg (227 lb)   Height: 160 cm (63\")       PHYSICAL EXAM  General appearance: Awake, alert, cooperative  Head: Normocephalic, without obvious abnormality, atraumatic  Eyes: Conjunctivae/corneas clear, EOMs intact  Neck: no adenopathy, no carotid bruit, no JVD, and thyroid: not enlarged  Lungs: clear to auscultation bilaterally and no rhonchi or crackles\", ' symmetric  Heart: regular rate and rhythm, S1, S2 normal, no murmur, click, rub or gallop  Abdomen: Soft, non-tender, bowel sounds normal,  no organomegaly  Extremities: extremities normal, atraumatic, no cyanosis or edema  Skin: Skin color, turgor normal, no rashes or lesions  Neurologic: Grossly normal     Lab Results   Component Value Date    GLUCOSE 91 09/23/2023    CALCIUM 9.3 09/23/2023     09/23/2023    K 3.7 09/23/2023    CO2 24.1 09/23/2023     09/23/2023    BUN 8 09/23/2023    CREATININE 0.79 09/23/2023    EGFRIFNONA 83 08/22/2017    BCR 10.1 " 09/23/2023    ANIONGAP 10.9 09/23/2023     Lab Results   Component Value Date    WBC 7.97 09/23/2023    HGB 13.1 09/23/2023    HCT 40.5 09/23/2023    MCV 90.2 09/23/2023     09/23/2023     Lab Results   Component Value Date    INR 1.04 09/23/2023    INR 0.99 02/03/2023    PROTIME 14.1 09/23/2023    PROTIME 10.3 02/03/2023     Lab Results   Component Value Date    TSH 2.790 09/23/2023       Cardiac Testing:      I personally viewed and interpreted the patient's EKG/Telemetry/lab data      ECG 12 Lead    Date/Time: 3/4/2024 10:10 AM  Performed by: Harshal Maurice DO    Authorized by: Harshal Maurice DO  Comparison: compared with previous ECG   Rhythm: sinus rhythm      Tobacco Cessation: N/A  Obstructive Sleep Apnea Screening: Completed    Advance Care Planning   ACP discussion was declined by the patient. Patient does not have an advance directive, declines further assistance.       Assessment & Plan    Diagnoses and all orders for this visit:    1. Postural dizziness with presyncope (Primary)         Diagnosis Plan   1. Postural dizziness with presyncope  Patient referred for discussion about heart rate variability.    This patient has normal heart rate variability.    She has no significant bradycardia.  She has no significant tachycardia.  I reviewed all of her ambulatory heart rhythm monitors.  They demonstrate excellent heart rate variability and there is no pathology here.    She has had a lot of anxiety provoked by repeated medical testing.    Lots of reassurance given.  She was happy to hear my opinion.    Certainly no indication for pacemaker as it was brought up to her by the nurse practitioner in [place].    I encouraged her to follow-up with her echocardiogram.  I expect that this will likely be normal.  After this echo if indeed it is normal no more cardiac testing for a while please.    Prognosis is excellent.        Body mass index is 40.21 kg/m².    I spent 45 minutes in consultation with this  patient which included more than 65% of this time in direct face-to-face counseling, physical examination and discussion of my assessment and findings and this shared decision making with the patient.  The remainder of the time not spent face-to-face was performing one, some or all of the following actions: preparing to see the patient (e.g. reviewing tests, prior clinicians' notes, etc), ordering medications, tests or procedures, coordination of care, discussion of the plan with other healthcare providers, documenting clinical information in epic as well as independently interpreting results and communication of these results to the patient family and/or caregiver(s).  Please note that this explicitly excludes time spent on other separate billable services such as performing procedures or test interpretation, when applicable.    Follow Up:       Thank you for allowing me to participate in the care of your patient. Please to not hesitate to contact me with additional questions or concerns.      Harshal Maurice DO, FACC, RS  Cardiac Electrophysiologist  [facility] / [facility]

## 2025-02-24 ENCOUNTER — TELEMEDICINE (OUTPATIENT)
Dept: PSYCHIATRY | Facility: CLINIC | Age: 30
End: 2025-02-24
Payer: MEDICAID

## 2025-02-24 DIAGNOSIS — G47.00 INSOMNIA, UNSPECIFIED TYPE: ICD-10-CM

## 2025-02-24 DIAGNOSIS — F41.1 GENERALIZED ANXIETY DISORDER: Primary | Chronic | ICD-10-CM

## 2025-02-24 DIAGNOSIS — F43.10 POST TRAUMATIC STRESS DISORDER (PTSD): Chronic | ICD-10-CM

## 2025-02-24 DIAGNOSIS — Z86.59 HISTORY OF MAJOR DEPRESSION: ICD-10-CM

## 2025-02-24 PROCEDURE — 90792 PSYCH DIAG EVAL W/MED SRVCS: CPT | Performed by: NURSE PRACTITIONER

## 2025-02-24 PROCEDURE — 1159F MED LIST DOCD IN RCRD: CPT | Performed by: NURSE PRACTITIONER

## 2025-02-24 PROCEDURE — 1160F RVW MEDS BY RX/DR IN RCRD: CPT | Performed by: NURSE PRACTITIONER

## 2025-02-24 RX ORDER — HYDROXYZINE HYDROCHLORIDE 10 MG/1
10 TABLET, FILM COATED ORAL 3 TIMES DAILY PRN
Qty: 90 TABLET | Refills: 0 | Status: SHIPPED | OUTPATIENT
Start: 2025-02-24

## 2025-02-24 RX ORDER — SERTRALINE HYDROCHLORIDE 25 MG/1
25 TABLET, FILM COATED ORAL DAILY
Qty: 30 TABLET | Refills: 0 | Status: SHIPPED | OUTPATIENT
Start: 2025-02-24

## 2025-02-24 RX ORDER — PROPRANOLOL HYDROCHLORIDE 10 MG/1
10 TABLET ORAL 2 TIMES DAILY PRN
COMMUNITY
Start: 2025-02-07 | End: 2025-02-24 | Stop reason: SDUPTHER

## 2025-02-24 RX ORDER — AMOXICILLIN 875 MG/1
TABLET, COATED ORAL
COMMUNITY
Start: 2025-02-16

## 2025-02-24 RX ORDER — HYDROXYZINE HYDROCHLORIDE 10 MG/1
10 TABLET, FILM COATED ORAL 3 TIMES DAILY PRN
COMMUNITY
Start: 2024-11-20 | End: 2025-02-24 | Stop reason: SDUPTHER

## 2025-02-24 RX ORDER — PROPRANOLOL HYDROCHLORIDE 10 MG/1
10 TABLET ORAL 2 TIMES DAILY PRN
Qty: 60 TABLET | Refills: 0 | Status: SHIPPED | OUTPATIENT
Start: 2025-02-24

## 2025-02-24 RX ORDER — LEVOTHYROXINE SODIUM 50 UG/1
TABLET ORAL
COMMUNITY
Start: 2024-12-16

## 2025-02-24 NOTE — PROGRESS NOTES
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 (2+) (A)  Negative Final    Nitrite, UA 09/23/2023 Negative  Negative Final    Urobilinogen, UA 09/23/2023 4.0 E.U./dL (A)  0.2 - 1.0 E.U./dL Final    HCG, Urine QL 09/23/2023 Negative  Negative Final    WBC 09/23/2023 7.97  3.40 - 10.80 10*3/mm3 Final    RBC 09/23/2023 4.49  3.77 - 5.28 10*6/mm3 Final    Hemoglobin 09/23/2023 13.1  12.0 - 15.9 g/dL Final    Hematocrit 09/23/2023 40.5  34.0 - 46.6 % Final    MCV 09/23/2023 90.2  79.0 - 97.0 fL Final    MCH 09/23/2023 29.2  26.6 - 33.0 pg Final    MCHC 09/23/2023 32.3  31.5 - 35.7 g/dL Final    RDW 09/23/2023 14.4  12.3 - 15.4 % Final    RDW-SD 09/23/2023 46.8  37.0 - 54.0 fl Final    MPV 09/23/2023 11.4  6.0 - 12.0 fL Final    Platelets 09/23/2023 257  140 - 450 10*3/mm3 Final    Neutrophil % 09/23/2023 67.9  42.7 - 76.0 % Final    Lymphocyte % 09/23/2023 21.2  19.6 - 45.3 % Final    Monocyte % 09/23/2023 8.4  5.0 - 12.0 % Final    Eosinophil % 09/23/2023 1.4  0.3 - 6.2 % Final    Basophil % 09/23/2023 0.8  0.0 - 1.5 % Final    Immature Grans % 09/23/2023 0.3  0.0 - 0.5 % Final    Neutrophils, Absolute 09/23/2023 5.42  1.70 - 7.00 10*3/mm3 Final    Lymphocytes, Absolute 09/23/2023 1.69  0.70 -  3.10 10*3/mm3 Final    Monocytes, Absolute 09/23/2023 0.67  0.10 - 0.90 10*3/mm3 Final    Eosinophils, Absolute 09/23/2023 0.11  0.00 - 0.40 10*3/mm3 Final    Basophils, Absolute 09/23/2023 0.06  0.00 - 0.20 10*3/mm3 Final    Immature Grans, Absolute 09/23/2023 0.02  0.00 - 0.05 10*3/mm3 Final    nRBC 09/23/2023 0.0  0.0 - 0.2 /100 WBC Final    RBC, UA 09/23/2023 13-20 (A)  None Seen, 0-2 /HPF Final    WBC, UA 09/23/2023 31-50 (A)  None Seen, 0-2 /HPF Final    Bacteria, UA 09/23/2023 2+ (A)  None Seen /HPF Final    Squamous Epithelial Cells, UA 09/23/2023 13-20 (A)  None Seen, 0-2 /HPF Final    Hyaline Casts, UA 09/23/2023 7-12  None Seen /LPF Final    Methodology 09/23/2023 Automated Microscopy   Final    HS Troponin T 09/23/2023 <6  <10 ng/L Final    Troponin T Numeric Delta 09/23/2023    Final    Unable to calculate.           Assessment & Plan   Problems Addressed this Visit    None  Visit Diagnoses       Generalized anxiety disorder  (Chronic)   -  Primary    Relevant Medications    sertraline (Zoloft) 25 MG tablet    propranolol (INDERAL) 10 MG tablet    hydrOXYzine (ATARAX) 10 MG tablet    Post traumatic stress disorder (PTSD)  (Chronic)       Relevant Medications    sertraline (Zoloft) 25 MG tablet    hydrOXYzine (ATARAX) 10 MG tablet    Insomnia, unspecified type        Relevant Medications    hydrOXYzine (ATARAX) 10 MG tablet    History of major depression        Patient reported as resolved          Diagnoses         Codes Comments    Generalized anxiety disorder    -  Primary ICD-10-CM: F41.1  ICD-9-CM: 300.02     Post traumatic stress disorder (PTSD)     ICD-10-CM: F43.10  ICD-9-CM: 309.81     Insomnia, unspecified type     ICD-10-CM: G47.00  ICD-9-CM: 780.52     History of major depression     ICD-10-CM: Z86.59  ICD-9-CM: V11.8 Patient reported as resolved            Visit Diagnoses:    ICD-10-CM ICD-9-CM   1. Generalized anxiety disorder  F41.1 300.02   2. Post traumatic stress disorder (PTSD)  F43.10  309.81   3. Insomnia, unspecified type  G47.00 780.52   4. History of major depression  Z86.59 V11.8          GOALS:  Short Term Goals: Patient will be compliant with medication, and patient will have no significant medication related side effects.  Patient will be engaged in psychotherapy as indicated.  Patient will report subjective improvement of symptoms.  Long term goals: To stabilize mood and treat/improve subjective symptoms, the patient will stay out of the hospital, the patient will be at an optimal level of functioning, and the patient will take all medications as prescribed.  The patient verbalized understanding and agreement with goals that were mutually set.      TREATMENT PLAN: Continue supportive psychotherapy efforts and take medications as indicated.  Medication and treatment options, both pharmacological and non-pharmacological treatment options, discussed during today's visit, including any off label use of medication. Patient acknowledged and verbally consented with current treatment plan and was educated on the importance of compliance with treatment and follow-up appointments.      -Continue propranolol 10 mg by mouth up to twice daily as needed for anxiety.  -Continue hydroxyzine 10 mg by mouth up to 3 times daily as needed for anxiety.  The patient reports she typically only takes this at bedtime.  -Begin sertraline 25 mg by mouth once daily for mood.  -Staff to obtain ANUPAM and obtain gene sight test results that patient reports were recently completed at [facility].  -The patient reports she is currently attending counseling through her  at [facility].  This APRN has discussed a possible referral to establish with psychotherapy through the [facility], but she declines at today's encounter.  She will reach out to this APRN/office if she changes her mind, and reports counseling through her  is enough at this time.      MEDICATION  ISSUES:  Discussed medication options and treatment plan of prescribed medication, any off label use of medication, as well as the risks, benefits, any black box warnings including increased suicidality, and side effects including but not limited to potential falls, dizziness, possible impaired driving, GI side effects (change in appetite, abdominal discomfort, nausea, vomiting, diarrhea, and/or constipation), dry mouth, somnolence, sedation, insomnia, activation, agitation, irritation, tremors, abnormal muscle movements or disorders, headache, sweating, possible bruising or rare bleeding, electrolyte and/or fluid abnormalities, change in blood pressure/heart rate/and or heart rhythm, sexual dysfunction, and metabolic adversities among others. Patient and/or guardian agreeable to call the office with any worsening of symptoms or onset of side effects, or if any concerns or questions arise.  The contact information for the office is made available to the patient and/or guardian.  Patient and/or guardian agreeable to call 911 or go to the nearest ER should they begin having any SI/HI, or if any urgent concerns arise.    Due to the nature of virtual visits and inability to monitor vital signs and weight with virtual visits, the patient has been encouraged to monitor their vital signs and weight regularly either through self-monitoring via home device(s) or with their Primary Care Provider, and the patient has been instructed to notify this APRN of any abnormalities or changes from baseline.    Patient aware of limitations of providers availability and office hours, and how to reach provider/office if needed (office number for patient to call for any questions/concerns: [phone]). It has been discussed with the patient if the patient's needs require more frequent or intensive management/monitoring than can be provided from this provider utilizing a strictly virtual platform, or care that is outside of this  provider's scope, then patient may be referred to more appropriate provider or modality. Patient verbalized understanding and agreement in their own words.      VERBAL INFORMED CONSENT FOR MEDICATION:  The patient was educated that their proposed/prescribed psychotropic medication(s) has potential risks, side effects, adverse effects, and black box warnings; and these have been discussed with the patient.  The patient has been informed that their treatment and medication dosage is to be individualized, and may even be above or below the recommended range/dosage due to patient individualization and response, but medication is prescribed using a shared decision making approach, and no medication or dosage will be prescribed without the patient's verbal consent.  The reason for the use of the medication including any off label use and alternative modes of treatment other than or in addition to medication has been considered and discussed, the probable consequences of not receiving the proposed treatment have been discussed, and any treatment side effects, black box warnings, and cautions associated with treatment have been discussed with the patient.  The patient is allowed ample time to openly discuss and ask questions regarding the proposed medication(s) and treatment plan and the patient verbalizes understanding the reasons for the use of the medication, its potential risks and benefits, other alternative treatment(s), and the probable consequences that may occur if the proposed medication is not given.  The patient has been given ample time to ask questions and study the information and find the information to be specific, accurate, and complete.  The patient gives verbal consent for the medication(s) proposed/prescribed, they verbalized understanding that they can refuse and withdraw consent at any time with the assistance of this APRN, and the patient has verbally confirmed that they are aware, and are willing,  to take the prescribed medication and follow the treatment plan with the known possible risks, side effect, black box warnings, and any potential medication interactions, and the patient reports they will be worse off without this medication and treatment plan.  The patient is advised to contact this APRN/this office if any questions or concerns arise at any time (at [phone]), or call 911/go to the closest emergency department if needed or outside of office hours.      SUICIDE RISK ASSESSMENT AND SAFETY PLAN: Unalterable demographics and a history of mental health intervention indicate this patient is in a high risk category compared to the general population. At present, the patient denies active SI/HI, intentions, or plans at this time and agrees to seek immediate care should such thoughts develop. The patient verbalizes understanding of how to access emergency care if needed and agrees to do so. Consideration of suicide risk and protective factors such as history, current presentation, individual strengths and weaknesses, psychosocial and environmental stressors and variables, psychiatric illness and symptoms, medical conditions and pain, took place in this interview. Based on those considerations, the patient is determined: within individual baseline and presenting no imminent risk for suicide or homicide. Other recommendations: The patient does not meet the criteria for inpatient admission and is not a safety risk to self or others at today's visit. Inpatient treatment offers no significant advantages over outpatient treatment for this patient at today's visit.  The patient was given ample time for questions and fully participated in treatment planning.  The patient was encouraged to call the clinic with any questions or concerns.  The patient was informed of access to emergency care. If patient were to develop any significant symptomatology, suicidal ideation, homicidal ideation, any concerns, or feel  unsafe at any time they are to call the clinic and if unable to get immediate assistance should immediately call 911 or go to the nearest emergency room.  Patient contracted verbally for the following: If you are experiencing an emotional crisis or have thoughts of harming yourself or others, please go to your nearest local emergency room or call 911. Will continue to re-assess medication response and side effects frequently to establish efficacy and ensure safety. Risks, any black box warnings, side effects, off label usage, and benefits of medication and treatment discussed with patient, along with potential adverse side effects of current and/or newly prescribed medication, alternative treatment options, and OTC medications.  Patient verbalized understanding of potential risks, any off label use of medication, any black box warnings, and any side effects in their own words. The patient verbalized understanding and agreed to comply with the safety plan discussed in their own words.  Patient given the number to the office. Number also discussed of the 24- hour suicide hotline.           MEDS ORDERED DURING VISIT:  New Medications Ordered This Visit   Medications    sertraline (Zoloft) 25 MG tablet     Sig: Take 1 tablet by mouth Daily.     Dispense:  30 tablet     Refill:  0    propranolol (INDERAL) 10 MG tablet     Sig: Take 1 tablet by mouth 2 (Two) Times a Day As Needed (Anxiety).     Dispense:  60 tablet     Refill:  0    hydrOXYzine (ATARAX) 10 MG tablet     Sig: Take 1 tablet by mouth 3 (Three) Times a Day As Needed (Anxiety and/or sleep).     Dispense:  90 tablet     Refill:  0       Return in about 4 weeks (around 3/24/2025), or if symptoms worsen or fail to improve, for Next scheduled follow up and Recheck.         Progress toward goal: Not at goal    Functional Status: Moderate impairment     Prognosis: Good with Ongoing Treatment             This document has been electronically signed by Katheryn GARCIA  ANT Carrera  February 24, 2025 20:01 EST      Please note that portions of this note were completed with a voice recognition program.

## 2025-02-25 ENCOUNTER — TELEPHONE (OUTPATIENT)
Dept: PSYCHIATRY | Facility: CLINIC | Age: 30
End: 2025-02-25
Payer: MEDICAID

## 2025-02-25 NOTE — TELEPHONE ENCOUNTER
Pt called back and gave a verbal release to obtain the pt genesight test results from Mirela CAIN at [facility].  Phone number: [phone].  The ANUPAM will be placed in the pt chart and has been faxed to [facility] Fax number [phone].

## 2025-02-25 NOTE — TELEPHONE ENCOUNTER
Lvm for patient to call office we need  a ANUPAM from the patient to request genesight testing from [facility].

## 2025-03-23 DIAGNOSIS — G47.00 INSOMNIA, UNSPECIFIED TYPE: ICD-10-CM

## 2025-03-23 DIAGNOSIS — F41.1 GENERALIZED ANXIETY DISORDER: Chronic | ICD-10-CM

## 2025-03-23 DIAGNOSIS — F43.10 POST TRAUMATIC STRESS DISORDER (PTSD): Chronic | ICD-10-CM

## 2025-03-24 ENCOUNTER — TELEMEDICINE (OUTPATIENT)
Dept: PSYCHIATRY | Facility: CLINIC | Age: 30
End: 2025-03-24
Payer: COMMERCIAL

## 2025-03-24 DIAGNOSIS — F41.1 GENERALIZED ANXIETY DISORDER: Chronic | ICD-10-CM

## 2025-03-24 DIAGNOSIS — F41.1 GENERALIZED ANXIETY DISORDER: Primary | Chronic | ICD-10-CM

## 2025-03-24 DIAGNOSIS — G47.00 INSOMNIA, UNSPECIFIED TYPE: ICD-10-CM

## 2025-03-24 DIAGNOSIS — F43.10 POST TRAUMATIC STRESS DISORDER (PTSD): Chronic | ICD-10-CM

## 2025-03-24 PROCEDURE — 99214 OFFICE O/P EST MOD 30 MIN: CPT | Performed by: NURSE PRACTITIONER

## 2025-03-24 PROCEDURE — 96127 BRIEF EMOTIONAL/BEHAV ASSMT: CPT | Performed by: NURSE PRACTITIONER

## 2025-03-24 RX ORDER — HYDROXYZINE HYDROCHLORIDE 10 MG/1
TABLET, FILM COATED ORAL
Qty: 90 TABLET | Refills: 0 | OUTPATIENT
Start: 2025-03-24

## 2025-03-24 RX ORDER — HYDROXYZINE HYDROCHLORIDE 10 MG/1
10 TABLET, FILM COATED ORAL 3 TIMES DAILY PRN
Qty: 90 TABLET | Refills: 0 | Status: SHIPPED | OUTPATIENT
Start: 2025-03-24

## 2025-03-24 RX ORDER — PROPRANOLOL HYDROCHLORIDE 10 MG/1
10 TABLET ORAL 2 TIMES DAILY PRN
Qty: 60 TABLET | Refills: 0 | Status: SHIPPED | OUTPATIENT
Start: 2025-03-24

## 2025-03-24 RX ORDER — PROPRANOLOL HYDROCHLORIDE 10 MG/1
10 TABLET ORAL 2 TIMES DAILY PRN
Qty: 60 TABLET | Refills: 0 | OUTPATIENT
Start: 2025-03-24

## 2025-03-24 RX ORDER — SERTRALINE HYDROCHLORIDE 25 MG/1
25 TABLET, FILM COATED ORAL DAILY
Qty: 30 TABLET | Refills: 0 | OUTPATIENT
Start: 2025-03-24

## 2025-03-24 RX ORDER — OXYCODONE AND ACETAMINOPHEN 5; 325 MG/1; MG/1
TABLET ORAL
COMMUNITY
Start: 2025-03-12

## 2025-03-24 RX ORDER — NORELGESTROMIN AND ETHINYL ESTRADIOL 35; 150 UG/MG; UG/MG
PATCH TRANSDERMAL
COMMUNITY
Start: 2025-03-23

## 2025-03-24 RX ORDER — AMOXICILLIN 875 MG/1
875 TABLET, COATED ORAL
COMMUNITY

## 2025-03-24 NOTE — PROGRESS NOTES
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 the probable consequences of not receiving the proposed treatment have been discussed, and any treatment side effects, black box warnings, and cautions associated with treatment have been discussed with the patient.  The patient is allowed ample time to openly discuss and ask questions regarding the proposed medication(s) and treatment plan and the patient verbalizes understanding the reasons for the use of the medication, its potential risks and benefits, other alternative treatment(s), and the probable consequences that may occur if the proposed medication is not given.  The patient has been given ample time to ask questions and study the information and find the information to be specific, accurate, and complete.  The patient gives verbal consent for the medication(s) proposed/prescribed, they verbalized understanding that they can refuse and withdraw consent at any time with the assistance of this APRN, and the patient has verbally confirmed that they are aware, and are willing, to take the prescribed medication and follow the treatment plan with the known possible risks, side effect, black box warnings, and any  potential medication interactions, and the patient reports they will be worse off without this medication and treatment plan.  The patient is advised to contact this APRN/this office if any questions or concerns arise at any time (at [phone]), or call 911/go to the closest emergency department if needed or outside of office hours.      SUICIDE RISK ASSESSMENT AND SAFETY PLAN: Unalterable demographics and a history of mental health intervention indicate this patient is in a high risk category compared to the general population. At present, the patient denies active SI/HI, intentions, or plans at this time and agrees to seek immediate care should such thoughts develop. The patient verbalizes understanding of how to access emergency care if needed and agrees to do so. Consideration of suicide risk and protective factors such as history, current presentation, individual strengths and weaknesses, psychosocial and environmental stressors and variables, psychiatric illness and symptoms, medical conditions and pain, took place in this interview. Based on those considerations, the patient is determined: within individual baseline and presenting no imminent risk for suicide or homicide. Other recommendations: The patient does not meet the criteria for inpatient admission and is not a safety risk to self or others at today's visit. Inpatient treatment offers no significant advantages over outpatient treatment for this patient at today's visit.  The patient was given ample time for questions and fully participated in treatment planning.  The patient was encouraged to call the clinic with any questions or concerns.  The patient was informed of access to emergency care. If patient were to develop any significant symptomatology, suicidal ideation, homicidal ideation, any concerns, or feel unsafe at any time they are to call the clinic and if unable to get immediate assistance should immediately call 911 or go to the nearest  emergency room.  Patient contracted verbally for the following: If you are experiencing an emotional crisis or have thoughts of harming yourself or others, please go to your nearest local emergency room or call 911. Will continue to re-assess medication response and side effects frequently to establish efficacy and ensure safety. Risks, any black box warnings, side effects, off label usage, and benefits of medication and treatment discussed with patient, along with potential adverse side effects of current and/or newly prescribed medication, alternative treatment options, and OTC medications.  Patient verbalized understanding of potential risks, any off label use of medication, any black box warnings, and any side effects in their own words. The patient verbalized understanding and agreed to comply with the safety plan discussed in their own words.  Patient given the number to the office. Number also discussed of the 24- hour suicide hotline.           MEDS ORDERED DURING VISIT:  New Medications Ordered This Visit   Medications    sertraline (Zoloft) 50 MG tablet     Sig: Take 1 tablet by mouth Daily.     Dispense:  30 tablet     Refill:  0     Increase in dosage.    propranolol (INDERAL) 10 MG tablet     Sig: Take 1 tablet by mouth 2 (Two) Times a Day As Needed (Anxiety).     Dispense:  60 tablet     Refill:  0    hydrOXYzine (ATARAX) 10 MG tablet     Sig: Take 1 tablet by mouth 3 (Three) Times a Day As Needed (Anxiety and/or sleep).     Dispense:  90 tablet     Refill:  0       Return in about 4 weeks (around 4/21/2025), or if symptoms worsen or fail to improve, for Next scheduled follow up and Recheck.         Progress toward goal: Not at goal    Functional Status: Moderate impairment     Prognosis: Good with Ongoing Treatment             This document has been electronically signed by ANT Blackwell  March 24, 2025 16:01 EDT    Some of the data in this electronic note has been brought forward from a  previous encounter, any necessary changes have been made, it has been reviewed by this APRN, and it is accurate.    Please note that portions of this note were completed with a voice recognition program.

## 2025-04-22 ENCOUNTER — TELEMEDICINE (OUTPATIENT)
Dept: PSYCHIATRY | Facility: CLINIC | Age: 30
End: 2025-04-22
Payer: COMMERCIAL

## 2025-04-22 DIAGNOSIS — G47.00 INSOMNIA, UNSPECIFIED TYPE: ICD-10-CM

## 2025-04-22 DIAGNOSIS — F43.10 POST TRAUMATIC STRESS DISORDER (PTSD): Chronic | ICD-10-CM

## 2025-04-22 DIAGNOSIS — F41.1 GENERALIZED ANXIETY DISORDER: Primary | Chronic | ICD-10-CM

## 2025-04-22 RX ORDER — HYDROXYZINE HYDROCHLORIDE 10 MG/1
10 TABLET, FILM COATED ORAL 3 TIMES DAILY PRN
Qty: 90 TABLET | Refills: 0 | Status: SHIPPED | OUTPATIENT
Start: 2025-04-22

## 2025-04-22 RX ORDER — SERTRALINE HYDROCHLORIDE 100 MG/1
100 TABLET, FILM COATED ORAL DAILY
Qty: 30 TABLET | Refills: 0 | Status: SHIPPED | OUTPATIENT
Start: 2025-04-22

## 2025-04-22 RX ORDER — PROPRANOLOL HYDROCHLORIDE 10 MG/1
10 TABLET ORAL 2 TIMES DAILY PRN
Qty: 60 TABLET | Refills: 0 | Status: SHIPPED | OUTPATIENT
Start: 2025-04-22

## 2025-04-22 RX ORDER — ERGOCALCIFEROL 1.25 MG/1
CAPSULE, LIQUID FILLED ORAL
COMMUNITY
Start: 2025-04-02

## 2025-04-22 NOTE — PROGRESS NOTES
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 individualized, and may even be above or below the recommended range/dosage due to patient individualization and response, but medication is prescribed using a shared decision making approach, and no medication or dosage will be prescribed without the patient's verbal consent.  The reason for the use of the medication including any off label use and alternative modes of treatment other than or in addition to medication has been considered and discussed, the probable consequences of not receiving the proposed treatment have been discussed, and any treatment side effects, black box warnings, and cautions associated with treatment have been discussed with the patient.  The patient is allowed ample time to openly discuss and ask questions regarding the proposed medication(s) and treatment plan and the patient verbalizes understanding the reasons for the use of the medication, its potential risks and benefits, other alternative treatment(s), and the probable consequences that may occur if the proposed medication is not given.  The patient has been given ample time to ask questions and study the information and find the information to be  specific, accurate, and complete.  The patient gives verbal consent for the medication(s) proposed/prescribed, they verbalized understanding that they can refuse and withdraw consent at any time with the assistance of this APRN, and the patient has verbally confirmed that they are aware, and are willing, to take the prescribed medication and follow the treatment plan with the known possible risks, side effect, black box warnings, and any potential medication interactions, and the patient reports they will be worse off without this medication and treatment plan.  The patient is advised to contact this APRN/this office if any questions or concerns arise at any time (at [phone]), or call 911/go to the closest emergency department if needed or outside of office hours.      SUICIDE RISK ASSESSMENT AND SAFETY PLAN: Unalterable demographics and a history of mental health intervention indicate this patient is in a high risk category compared to the general population. At present, the patient denies active SI/HI, intentions, or plans at this time and agrees to seek immediate care should such thoughts develop. The patient verbalizes understanding of how to access emergency care if needed and agrees to do so. Consideration of suicide risk and protective factors such as history, current presentation, individual strengths and weaknesses, psychosocial and environmental stressors and variables, psychiatric illness and symptoms, medical conditions and pain, took place in this interview. Based on those considerations, the patient is determined: within individual baseline and presenting no imminent risk for suicide or homicide. Other recommendations: The patient does not meet the criteria for inpatient admission and is not a safety risk to self or others at today's visit. Inpatient treatment offers no significant advantages over outpatient treatment for this patient at today's visit.  The patient was given ample time for  questions and fully participated in treatment planning.  The patient was encouraged to call the clinic with any questions or concerns.  The patient was informed of access to emergency care. If patient were to develop any significant symptomatology, suicidal ideation, homicidal ideation, any concerns, or feel unsafe at any time they are to call the clinic and if unable to get immediate assistance should immediately call 911 or go to the nearest emergency room.  Patient contracted verbally for the following: If you are experiencing an emotional crisis or have thoughts of harming yourself or others, please go to your nearest local emergency room or call 911. Will continue to re-assess medication response and side effects frequently to establish efficacy and ensure safety. Risks, any black box warnings, side effects, off label usage, and benefits of medication and treatment discussed with patient, along with potential adverse side effects of current and/or newly prescribed medication, alternative treatment options, and OTC medications.  Patient verbalized understanding of potential risks, any off label use of medication, any black box warnings, and any side effects in their own words. The patient verbalized understanding and agreed to comply with the safety plan discussed in their own words.  Patient given the number to the office. Number also discussed of the 24- hour suicide hotline.           MEDS ORDERED DURING VISIT:  New Medications Ordered This Visit   Medications    sertraline (Zoloft) 100 MG tablet     Sig: Take 1 tablet by mouth Daily.     Dispense:  30 tablet     Refill:  0     Increase in dosage.    propranolol (INDERAL) 10 MG tablet     Sig: Take 1 tablet by mouth 2 (Two) Times a Day As Needed (Anxiety).     Dispense:  60 tablet     Refill:  0    hydrOXYzine (ATARAX) 10 MG tablet     Sig: Take 1 tablet by mouth 3 (Three) Times a Day As Needed (Anxiety and/or sleep).     Dispense:  90 tablet     Refill:  0        Return in about 4 weeks (around 5/20/2025), or if symptoms worsen or fail to improve, for Next scheduled follow up and Recheck.         Progress toward goal: Not at goal    Functional Status: Moderate impairment     Prognosis: Good with Ongoing Treatment             This document has been electronically signed by ANT Blackwell  April 22, 2025 15:21 EDT    Some of the data in this electronic note has been brought forward from a previous encounter, any necessary changes have been made, it has been reviewed by this APRN, and it is accurate.    Please note that portions of this note were completed with a voice recognition program.

## 2025-05-01 DIAGNOSIS — G47.00 INSOMNIA, UNSPECIFIED TYPE: ICD-10-CM

## 2025-05-01 DIAGNOSIS — F41.1 GENERALIZED ANXIETY DISORDER: Chronic | ICD-10-CM

## 2025-05-20 DIAGNOSIS — F43.10 POST TRAUMATIC STRESS DISORDER (PTSD): Chronic | ICD-10-CM

## 2025-05-20 DIAGNOSIS — F41.1 GENERALIZED ANXIETY DISORDER: Chronic | ICD-10-CM

## 2025-05-20 RX ORDER — SERTRALINE HYDROCHLORIDE 100 MG/1
100 TABLET, FILM COATED ORAL DAILY
Qty: 30 TABLET | Refills: 0 | Status: SHIPPED | OUTPATIENT
Start: 2025-05-20

## 2025-06-23 ENCOUNTER — TELEMEDICINE (OUTPATIENT)
Dept: PSYCHIATRY | Facility: CLINIC | Age: 30
End: 2025-06-23
Payer: COMMERCIAL

## 2025-06-23 DIAGNOSIS — F43.10 POST TRAUMATIC STRESS DISORDER (PTSD): Chronic | ICD-10-CM

## 2025-06-23 DIAGNOSIS — F41.1 GENERALIZED ANXIETY DISORDER: Primary | Chronic | ICD-10-CM

## 2025-06-23 DIAGNOSIS — G47.00 INSOMNIA, UNSPECIFIED TYPE: ICD-10-CM

## 2025-06-23 PROCEDURE — 99214 OFFICE O/P EST MOD 30 MIN: CPT | Performed by: NURSE PRACTITIONER

## 2025-06-23 PROCEDURE — 96127 BRIEF EMOTIONAL/BEHAV ASSMT: CPT | Performed by: NURSE PRACTITIONER

## 2025-06-23 RX ORDER — SERTRALINE HYDROCHLORIDE 100 MG/1
100 TABLET, FILM COATED ORAL DAILY
Qty: 30 TABLET | Refills: 1 | Status: SHIPPED | OUTPATIENT
Start: 2025-06-23

## 2025-06-23 RX ORDER — HYDROXYZINE HYDROCHLORIDE 10 MG/1
10 TABLET, FILM COATED ORAL 3 TIMES DAILY PRN
Qty: 90 TABLET | Refills: 0 | OUTPATIENT
Start: 2025-06-23

## 2025-06-23 RX ORDER — SERTRALINE HYDROCHLORIDE 25 MG/1
25 TABLET, FILM COATED ORAL DAILY
Qty: 30 TABLET | Refills: 1 | Status: SHIPPED | OUTPATIENT
Start: 2025-06-23

## 2025-06-23 NOTE — PROGRESS NOTES
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 proposed/prescribed psychotropic medication(s) has potential risks, side effects, adverse effects, and black box warnings; and these have been discussed with the patient.  The patient has been informed that their treatment and medication dosage is to be individualized, and may even be above or below the recommended range/dosage due to patient individualization and response, but medication is prescribed using a shared decision making approach, and no medication or dosage will be prescribed without the patient's verbal consent.  The reason for the use of the medication including any off label use and alternative modes of treatment other than or in addition to medication has been considered and discussed, the probable consequences of not receiving the proposed treatment have been discussed, and any treatment side effects, black box warnings, and cautions associated with treatment have been discussed with the patient.  The patient is allowed ample time to openly discuss and ask questions regarding the proposed medication(s) and treatment plan and the patient verbalizes understanding the reasons for the use of the medication, its potential risks and benefits, other alternative  treatment(s), and the probable consequences that may occur if the proposed medication is not given.  The patient has been given ample time to ask questions and study the information and find the information to be specific, accurate, and complete.  The patient gives verbal consent for the medication(s) proposed/prescribed, they verbalized understanding that they can refuse and withdraw consent at any time with the assistance of this APRN, and the patient has verbally confirmed that they are aware, and are willing, to take the prescribed medication and follow the treatment plan with the known possible risks, side effect, black box warnings, and any potential medication interactions, and the patient reports they will be worse off without this medication and treatment plan.  The patient is advised to contact this APRN/this office if any questions or concerns arise at any time (at [phone]), or call 911/go to the closest emergency department if needed or outside of office hours.      SUICIDE RISK ASSESSMENT AND SAFETY PLAN: Unalterable demographics and a history of mental health intervention indicate this patient is in a high risk category compared to the general population. At present, the patient denies active SI/HI, intentions, or plans at this time and agrees to seek immediate care should such thoughts develop. The patient verbalizes understanding of how to access emergency care if needed and agrees to do so. Consideration of suicide risk and protective factors such as history, current presentation, individual strengths and weaknesses, psychosocial and environmental stressors and variables, psychiatric illness and symptoms, medical conditions and pain, took place in this interview. Based on those considerations, the patient is determined: within individual baseline and presenting no imminent risk for suicide or homicide. Other recommendations: The patient does not meet the criteria for inpatient admission and is  not a safety risk to self or others at today's visit. Inpatient treatment offers no significant advantages over outpatient treatment for this patient at today's visit.  The patient was given ample time for questions and fully participated in treatment planning.  The patient was encouraged to call the clinic with any questions or concerns.  The patient was informed of access to emergency care. If patient were to develop any significant symptomatology, suicidal ideation, homicidal ideation, any concerns, or feel unsafe at any time they are to call the clinic and if unable to get immediate assistance should immediately call 911 or go to the nearest emergency room.  Patient contracted verbally for the following: If you are experiencing an emotional crisis or have thoughts of harming yourself or others, please go to your nearest local emergency room or call 911. Will continue to re-assess medication response and side effects frequently to establish efficacy and ensure safety. Risks, any black box warnings, side effects, off label usage, and benefits of medication and treatment discussed with patient, along with potential adverse side effects of current and/or newly prescribed medication, alternative treatment options, and OTC medications.  Patient verbalized understanding of potential risks, any off label use of medication, any black box warnings, and any side effects in their own words. The patient verbalized understanding and agreed to comply with the safety plan discussed in their own words.  Patient given the number to the office. Number also discussed of the 24- hour suicide hotline.           MEDS ORDERED DURING VISIT:  New Medications Ordered This Visit   Medications    sertraline (Zoloft) 100 MG tablet     Sig: Take 1 tablet by mouth Daily. To take with sertraline 25 mg tablet for total daily dosage of sertraline 125 mg by mouth once daily.     Dispense:  30 tablet     Refill:  1    sertraline (Zoloft) 25 MG  tablet     Sig: Take 1 tablet by mouth Daily. To take with sertraline 100 mg tablet for total daily dosage of sertraline 125 mg by mouth once daily.     Dispense:  30 tablet     Refill:  1       Return in about 4 weeks (around 7/21/2025), or if symptoms worsen or fail to improve, for Next scheduled follow up and Recheck.         Progress toward goal: Not at goal    Functional Status: Moderate impairment     Prognosis: Good with Ongoing Treatment             This document has been electronically signed by ANT Blackwell  June 23, 2025 10:46 EDT    Some of the data in this electronic note has been brought forward from a previous encounter, any necessary changes have been made, it has been reviewed by this APRN, and it is accurate.    Please note that portions of this note were completed with a voice recognition program.

## 2025-07-31 ENCOUNTER — TELEMEDICINE (OUTPATIENT)
Dept: PSYCHIATRY | Facility: CLINIC | Age: 30
End: 2025-07-31
Payer: COMMERCIAL

## 2025-07-31 DIAGNOSIS — F43.10 POST TRAUMATIC STRESS DISORDER (PTSD): Chronic | ICD-10-CM

## 2025-07-31 DIAGNOSIS — F41.1 GENERALIZED ANXIETY DISORDER: Primary | Chronic | ICD-10-CM

## 2025-07-31 DIAGNOSIS — G47.00 INSOMNIA, UNSPECIFIED TYPE: ICD-10-CM

## 2025-07-31 RX ORDER — SERTRALINE HYDROCHLORIDE 100 MG/1
150 TABLET, FILM COATED ORAL DAILY
Qty: 45 TABLET | Refills: 0 | Status: SHIPPED | OUTPATIENT
Start: 2025-07-31

## 2025-07-31 RX ORDER — HYDROXYZINE HYDROCHLORIDE 10 MG/1
10 TABLET, FILM COATED ORAL 3 TIMES DAILY PRN
Qty: 90 TABLET | Refills: 0 | Status: SHIPPED | OUTPATIENT
Start: 2025-07-31

## 2025-07-31 NOTE — PROGRESS NOTES
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 medication(s) has potential risks, side effects, adverse effects, and black box warnings; and these have been discussed with the patient.  The patient has been informed that their treatment and medication dosage is to be individualized, and may even be above or below the recommended range/dosage due to patient individualization and response, but medication is prescribed using a shared decision making approach, and no medication or dosage will be prescribed without the patient's verbal consent.  The reason for the use of the medication including any off label use and alternative modes of treatment other than or in addition to medication has been considered and discussed, the probable consequences of not receiving the proposed treatment have been discussed, and any treatment side effects, black box warnings, and cautions associated with treatment have been discussed with the patient.  The patient is allowed ample time to openly discuss and ask questions regarding the proposed medication(s) and treatment plan and the patient verbalizes understanding the reasons for the use of the medication, its potential risks and benefits, other alternative treatment(s), and the probable consequences that may occur if the proposed  medication is not given.  The patient has been given ample time to ask questions and study the information and find the information to be specific, accurate, and complete.  The patient gives verbal consent for the medication(s) proposed/prescribed, they verbalized understanding that they can refuse and withdraw consent at any time with the assistance of this APRN, and the patient has verbally confirmed that they are aware, and are willing, to take the prescribed medication and follow the treatment plan with the known possible risks, side effect, black box warnings, and any potential medication interactions, and the patient reports they will be worse off without this medication and treatment plan.  The patient is advised to contact this APRN/this office if any questions or concerns arise at any time (at [phone]), or call 911/go to the closest emergency department if needed or outside of office hours.      SUICIDE RISK ASSESSMENT AND SAFETY PLAN: Unalterable demographics and a history of mental health intervention indicate this patient is in a high risk category compared to the general population. At present, the patient denies active SI/HI, intentions, or plans at this time and agrees to seek immediate care should such thoughts develop. The patient verbalizes understanding of how to access emergency care if needed and agrees to do so. Consideration of suicide risk and protective factors such as history, current presentation, individual strengths and weaknesses, psychosocial and environmental stressors and variables, psychiatric illness and symptoms, medical conditions and pain, took place in this interview. Based on those considerations, the patient is determined: within individual baseline and presenting no imminent risk for suicide or homicide. Other recommendations: The patient does not meet the criteria for inpatient admission and is not a safety risk to self or others at today's visit. Inpatient treatment  offers no significant advantages over outpatient treatment for this patient at today's visit.  The patient was given ample time for questions and fully participated in treatment planning.  The patient was encouraged to call the clinic with any questions or concerns.  The patient was informed of access to emergency care. If patient were to develop any significant symptomatology, suicidal ideation, homicidal ideation, any concerns, or feel unsafe at any time they are to call the clinic and if unable to get immediate assistance should immediately call 911 or go to the nearest emergency room.  Patient contracted verbally for the following: If you are experiencing an emotional crisis or have thoughts of harming yourself or others, please go to your nearest local emergency room or call 911. Will continue to re-assess medication response and side effects frequently to establish efficacy and ensure safety. Risks, any black box warnings, side effects, off label usage, and benefits of medication and treatment discussed with patient, along with potential adverse side effects of current and/or newly prescribed medication, alternative treatment options, and OTC medications.  Patient verbalized understanding of potential risks, any off label use of medication, any black box warnings, and any side effects in their own words. The patient verbalized understanding and agreed to comply with the safety plan discussed in their own words.  Patient given the number to the office. Number also discussed of the 24- hour suicide hotline.           MEDS ORDERED DURING VISIT:  New Medications Ordered This Visit   Medications    sertraline (Zoloft) 100 MG tablet     Sig: Take 1.5 tablets by mouth Daily.     Dispense:  45 tablet     Refill:  0    hydrOXYzine (ATARAX) 10 MG tablet     Sig: Take 1 tablet by mouth 3 (Three) Times a Day As Needed (Anxiety and/or sleep).     Dispense:  90 tablet     Refill:  0       Return in about 4 weeks (around  8/28/2025), or if symptoms worsen or fail to improve, for Next scheduled follow up and Recheck.         Progress toward goal: Not at goal    Functional Status: Moderate impairment     Prognosis: Good with Ongoing Treatment             This document has been electronically signed by ANT Blackwell  July 31, 2025 11:11 EDT    Some of the data in this electronic note has been brought forward from a previous encounter, any necessary changes have been made, it has been reviewed by this APRN, and it is accurate.    Please note that portions of this note were completed with a voice recognition program.

## 2025-08-25 ENCOUNTER — TELEPHONE (OUTPATIENT)
Age: 30
End: 2025-08-25
Payer: COMMERCIAL

## 2025-08-27 ENCOUNTER — TELEMEDICINE (OUTPATIENT)
Dept: PSYCHIATRY | Facility: CLINIC | Age: 30
End: 2025-08-27
Payer: COMMERCIAL

## 2025-08-27 DIAGNOSIS — F43.21 GRIEF: ICD-10-CM

## 2025-08-27 DIAGNOSIS — F43.10 POST TRAUMATIC STRESS DISORDER (PTSD): Chronic | ICD-10-CM

## 2025-08-27 DIAGNOSIS — F41.1 GENERALIZED ANXIETY DISORDER: Primary | Chronic | ICD-10-CM

## 2025-08-27 DIAGNOSIS — Z79.899 HIGH RISK MEDICATION USE: ICD-10-CM

## 2025-08-27 RX ORDER — CLONAZEPAM 0.5 MG/1
0.5 TABLET ORAL 2 TIMES DAILY PRN
Qty: 10 TABLET | Refills: 0 | Status: CANCELLED | OUTPATIENT
Start: 2025-08-27

## 2025-08-27 RX ORDER — SERTRALINE HYDROCHLORIDE 100 MG/1
150 TABLET, FILM COATED ORAL DAILY
Qty: 45 TABLET | Refills: 0 | Status: SHIPPED | OUTPATIENT
Start: 2025-08-27

## 2025-08-28 ENCOUNTER — TELEPHONE (OUTPATIENT)
Dept: PSYCHIATRY | Facility: CLINIC | Age: 30
End: 2025-08-28
Payer: COMMERCIAL